# Patient Record
Sex: FEMALE | Race: WHITE | NOT HISPANIC OR LATINO | URBAN - METROPOLITAN AREA
[De-identification: names, ages, dates, MRNs, and addresses within clinical notes are randomized per-mention and may not be internally consistent; named-entity substitution may affect disease eponyms.]

---

## 2021-06-16 ENCOUNTER — OFFICE VISIT (OUTPATIENT)
Dept: URGENT CARE | Facility: CLINIC | Age: 21
End: 2021-06-16
Payer: COMMERCIAL

## 2021-06-16 VITALS
SYSTOLIC BLOOD PRESSURE: 103 MMHG | TEMPERATURE: 97.5 F | RESPIRATION RATE: 18 BRPM | HEIGHT: 66 IN | BODY MASS INDEX: 23.95 KG/M2 | DIASTOLIC BLOOD PRESSURE: 70 MMHG | OXYGEN SATURATION: 100 % | HEART RATE: 69 BPM | WEIGHT: 149 LBS

## 2021-06-16 DIAGNOSIS — H10.31 ACUTE CONJUNCTIVITIS OF RIGHT EYE, UNSPECIFIED ACUTE CONJUNCTIVITIS TYPE: Primary | ICD-10-CM

## 2021-06-16 PROCEDURE — 99203 OFFICE O/P NEW LOW 30 MIN: CPT | Performed by: FAMILY MEDICINE

## 2021-06-16 RX ORDER — TOBRAMYCIN 3 MG/ML
1 SOLUTION/ DROPS OPHTHALMIC EVERY 6 HOURS
Qty: 5 ML | Refills: 0 | Status: SHIPPED | OUTPATIENT
Start: 2021-06-16 | End: 2021-06-23

## 2021-06-16 NOTE — PATIENT INSTRUCTIONS
1  Acute Conjunctivitis of the right eye  - Tobramycin eye drops prescribed, to be used as directed  - no contact lenses or eye make up until condition resolves   - if symptoms persist despite treatment, follow up w/ PCP for re-check

## 2021-06-16 NOTE — PROGRESS NOTES
[facility]        NAME: Sarah Casey is a 24 y o  female  : 2000    MRN: [id number]  DATE: 2021  TIME: 2:04 PM    Assessment and Plan   Acute conjunctivitis of right eye, unspecified acute conjunctivitis type [H10 31]  1  Acute conjunctivitis of right eye, unspecified acute conjunctivitis type  tobramycin (TOBREX) 0 3 % SOLN         Patient Instructions     Patient Instructions   1  Acute Conjunctivitis of the right eye  - Tobramycin eye drops prescribed, to be used as directed  - no contact lenses or eye make up until condition resolves   - if symptoms persist despite treatment, follow up w/ PCP for re-check     Follow up with PCP in 3-5 days  Proceed to  ER if symptoms worsen  Chief Complaint     Chief Complaint   Patient presents with    Conjunctivitis     concerns for pink eye of the right eye         History of Present Illness       23 yo female presents for concerns of pink eye of the right eye  She states the right eye is red and feels irritated for the past 1 day  She denies any injury to the eye  No eye pain or vision changes  She does wear contact lenses  No foreign body sensation  No photophobia  She denies any tearing or discharge, but has noted some crusting of the eyelashes  She denies any styes  No L eye symptoms  No fever/chills  No headache or body aches  No cold/URI symptoms  No GI sx  No skin rashes  She has no known allergies  She denies any chance of pregnancy  She has not attempted any treatment for the symptoms  Review of Systems   Review of Systems   Constitutional: Negative  HENT: Negative  Eyes:        As noted in HPI   Respiratory: Negative  Cardiovascular: Negative  Gastrointestinal: Negative  Musculoskeletal: Negative  Skin: Negative  Allergic/Immunologic: Negative  Neurological: Negative  Hematological: Negative            Current Medications       Current Outpatient Medications:     tobramycin (TOBREX) 0 3 % SOLN, Administer 1 drop to the right eye every 6 (six) hours for 7 days, Disp: 5 mL, Rfl: 0    Current Allergies     Allergies as of 06/16/2021    (No Known Allergies)            The following portions of the patient's history were reviewed and updated as appropriate: allergies, current medications, past family history, past medical history, past social history, past surgical history and problem list      Past Medical History:   Diagnosis Date    Atrial tachycardia ([address] )        Past Surgical History:   Procedure Laterality Date    NO PAST SURGERIES         Family History   Problem Relation Age of Onset    Heart disease Mother     No Known Problems Father          Medications have been verified  Objective   /70 (BP Location: Right arm, Patient Position: Sitting, Cuff Size: Standard)   Pulse 69   Temp 97 5 °F (36 4 °C)   Resp 18   Ht 5' 6" (1 676 m)   Wt 67 6 kg (149 lb)   SpO2 100%   BMI 24 05 kg/m²   No LMP recorded  Physical Exam     Physical Exam  Vitals and nursing note reviewed  Constitutional:       General: She is awake  She is not in acute distress  Appearance: Normal appearance  She is well-developed, well-groomed and normal weight  She is not ill-appearing, toxic-appearing or diaphoretic  Eyes:      General: Lids are normal  Vision grossly intact  Gaze aligned appropriately  Right eye: No foreign body, discharge or hordeolum  Left eye: No foreign body, discharge or hordeolum  Extraocular Movements: Extraocular movements intact  Conjunctiva/sclera:      Right eye: Right conjunctiva is injected  No hemorrhage  Left eye: Left conjunctiva is not injected  No hemorrhage  Pupils: Pupils are equal, round, and reactive to light  Comments: No proptosis  No periorbital swelling, erythema, or bruising  Cardiovascular:      Rate and Rhythm: Normal rate  Pulses: Normal pulses     Pulmonary:      Effort: Pulmonary effort is normal  No tachypnea, accessory muscle usage or respiratory distress  Skin:     General: Skin is warm and dry  Coloration: Skin is not pale  Neurological:      Mental Status: She is alert and oriented to person, place, and time  Mental status is at baseline  Psychiatric:         Attention and Perception: Attention and perception normal          Mood and Affect: Mood and affect normal          Speech: Speech normal          Behavior: Behavior normal  Behavior is cooperative  Thought Content:  Thought content normal          Cognition and Memory: Cognition and memory normal          Judgment: Judgment normal

## 2021-06-16 NOTE — LETTER
June 16, 2021     Patient: Parisa Fanrsworth   YOB: 2000   Date of Visit: 6/16/2021       To Whom it May Concern:    Parisa Farnsworth was seen in my clinic on 6/16/2021  Please excuse from work for 06/16/2021  If you have any questions or concerns, please don't hesitate to call           Sincerely,          Anayeli Garduno MD

## 2021-11-05 ENCOUNTER — TRANSCRIBE ORDERS (OUTPATIENT)
Dept: URGENT CARE | Facility: CLINIC | Age: 21
End: 2021-11-05
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
## 2021-11-05 PROCEDURE — 86480 TB TEST CELL IMMUN MEASURE: CPT | Performed by: PHYSICIAN ASSISTANT

## 2023-08-02 ENCOUNTER — APPOINTMENT (OUTPATIENT)
Dept: LAB | Facility: CLINIC | Age: 23
End: 2023-08-02

## 2023-08-02 DIAGNOSIS — Z02.1 PRE-EMPLOYMENT HEALTH SCREENING EXAMINATION: ICD-10-CM

## 2023-08-02 LAB
MEV IGG SER QL IA: NORMAL
MUV IGG SER QL IA: NORMAL
RUBV IGG SERPL IA-ACNC: 120.2 IU/ML
VZV IGG SER QL IA: ABNORMAL

## 2023-08-02 PROCEDURE — 86787 VARICELLA-ZOSTER ANTIBODY: CPT

## 2023-08-02 PROCEDURE — 86765 RUBEOLA ANTIBODY: CPT

## 2023-08-02 PROCEDURE — 86735 MUMPS ANTIBODY: CPT

## 2023-08-02 PROCEDURE — 86480 TB TEST CELL IMMUN MEASURE: CPT

## 2023-08-02 PROCEDURE — 86762 RUBELLA ANTIBODY: CPT

## 2023-08-02 PROCEDURE — 36415 COLL VENOUS BLD VENIPUNCTURE: CPT

## 2023-08-05 LAB
GAMMA INTERFERON BACKGROUND BLD IA-ACNC: 0.02 IU/ML
M TB IFN-G BLD-IMP: NEGATIVE
M TB IFN-G CD4+ BCKGRND COR BLD-ACNC: 0 IU/ML
M TB IFN-G CD4+ BCKGRND COR BLD-ACNC: 0 IU/ML
MITOGEN IGNF BCKGRD COR BLD-ACNC: 6.55 IU/ML